# Patient Record
Sex: FEMALE | URBAN - METROPOLITAN AREA
[De-identification: names, ages, dates, MRNs, and addresses within clinical notes are randomized per-mention and may not be internally consistent; named-entity substitution may affect disease eponyms.]

---

## 2024-05-10 ENCOUNTER — HOSPITAL ENCOUNTER (OUTPATIENT)
Dept: TELEMEDICINE | Facility: HOSPITAL | Age: 49
Discharge: HOME | End: 2024-05-10
Payer: MEDICAID

## 2024-05-10 DIAGNOSIS — R20.0 RIGHT SIDED NUMBNESS: Primary | ICD-10-CM

## 2024-05-10 PROCEDURE — 99284 EMERGENCY DEPT VISIT MOD MDM: CPT | Mod: GT,,, | Performed by: STUDENT IN AN ORGANIZED HEALTH CARE EDUCATION/TRAINING PROGRAM

## 2024-05-10 NOTE — SUBJECTIVE & OBJECTIVE
HPI:  48 y.o. female with a PMHx significant for migraine headaches, HTN, obesity presenting with right-sided numbness and tingling involving her face, arm, and leg. LKN around 0630.     She is not on any AP/AC medications. History of migraine headaches, but denies any currently.      Images personally reviewed and interpreted:  Study: Head CT  Study Interpretation: No acute intracranial hemorrhage.      Assessment and plan:  # Right-sided numbness  Ddx possible TIA/small ischemic stroke vs other stroke mimic (e.g. migraine)    Lytics recommendation: Thrombolytic therapy not recommended due to Mild Non-Disabling Symptoms  Thrombectomy recommendation: No; at this time symptoms not suggestive of large vessel occlusion  Placement recommendation: pending further studies     - Recommend follow-up non-urgent MRI brain without contrast to evaluate for acute ischemia.  - If above studies are abnormal, please load images to teleneurology imaging system and contact us to review. If no acute abnormalities on MRI with persistent symptoms, then lower concern for a neurovascular etiology. As such, recommend close follow-up in neurology clinic.       Please contact us with any further questions or concerns or if patient has any acute neurological changes (new symptoms, worsening deficits).

## 2024-05-10 NOTE — TELEMEDICINE CONSULT
[facility]  Vascular Neurology  Comprehensive Stroke Center  TeleVascular Neurology Acute Consultation Note        Consult Information  Consults    Consulting Provider: JERROD PARKINSON   Current Providers  No providers found    Patient Location: [facility] Emergency Department    Spoke hospital nurse at bedside with patient assisting consultant.  Patient information was obtained from patient.       Stroke Documentation  Acute Stroke Times   Last Known Normal Date: 05/10/24  Last Known Normal Time: 0630  Symptom Onset Date: 05/10/24  Symptom Onset Time: 0630  Stroke Team Called Date: 05/10/24  Stroke Team Called Time: 1016  Stroke Team Arrival Date: 05/10/24  Stroke Team Arrival Time: 1021  CT Interpretation Time: 1033  Thrombolytic Therapy Recommended: No    NIH Scale:  Interval: baseline  1a. Level of Consciousness: 0-->Alert, keenly responsive  1b. LOC Questions: 0-->Answers both questions correctly  1c. LOC Commands: 0-->Performs both tasks correctly  2. Best Gaze: 0-->Normal  3. Visual: 0-->No visual loss  4. Facial Palsy: 0-->Normal symmetrical movements  5a. Motor Arm, Left: 0-->No drift, limb holds 90 (or 45) degrees for full 10 secs  5b. Motor Arm, Right: 0-->No drift, limb holds 90 (or 45) degrees for full 10 secs  6a. Motor Leg, Left: 1-->Drift, leg falls by the end of the 5-sec period but does not hit bed  6b. Motor Leg, Right: 0-->No drift, leg holds 30 degree position for full 5 secs  7. Limb Ataxia: 0-->Absent  8. Sensory: 0-->Normal, no sensory loss  9. Best Language: 0-->No aphasia, normal  10. Dysarthria: 0-->Normal  11. Extinction and Inattention (formerly Neglect): 0-->No abnormality  Total (NIH Stroke Scale): 1      Modified Ricardo: Score: 0  Story Coma Scale:     ABCD2 Score:    [id number] Score:    HAS -BLED Score:    ICH Score:    Hunt & Turpin Classification:      There were no vitals taken for this  visit.    Van Negative    Medical Decision Making  HPI:  48 y.o. female with a PMHx significant for migraine headaches, HTN, obesity presenting with right-sided numbness and tingling involving her face, arm, and leg. LKN around 0630.     She is not on any AP/AC medications. History of migraine headaches, but denies any currently.      Images personally reviewed and interpreted:  Study: Head CT  Study Interpretation: No acute intracranial hemorrhage.      Assessment and plan:  # Right-sided numbness  Ddx possible TIA/small ischemic stroke vs other stroke mimic (e.g. migraine)    Lytics recommendation: Thrombolytic therapy not recommended due to Mild Non-Disabling Symptoms  Thrombectomy recommendation: No; at this time symptoms not suggestive of large vessel occlusion  Placement recommendation: pending further studies     - Recommend follow-up non-urgent MRI brain without contrast to evaluate for acute ischemia.  - If above studies are abnormal, please load images to [facility] imaging system and contact us to review. If no acute abnormalities on MRI with persistent symptoms, then lower concern for a neurovascular etiology. As such, recommend close follow-up in neurology clinic.       Please contact us with any further questions or concerns or if patient has any acute neurological changes (new symptoms, worsening deficits).           ROS  Physical Exam  No past medical history on file.  No past surgical history on file.  No family history on file.    Diagnoses  Problem Noted   Right Sided Numbness 5/10/2024       Dorothy Quick MD      Emergent/Acute neurological consultation requested by spoke provider due to critical concerns for possible cerebrovascular event that could result in permanent loss of neurologic/bodily function, severe disability or death of this patient.  Immediate/timely evaluation by a highly prepared expert is paramount for optimal outcomes  High risk for neurological deterioration if not properly  diagnosed  High risk for neurological deterioration if not treated promplty/as soon as possible  Complex diagnostic evaluation may be required (advanced imaging)  High risk treatment options (thrombolytics and/or thrombectomy)    Patient care was coordinated with spoke provider, including but not limted to    Discussing likely diagnosis/etiology of symptoms  Making recommendations for further diagnostic studies  Making recommendations for intravenous thrombolytics or other advanced therapies  Making recommendations for disposition (admission/transfer for higher level of care)